# Patient Record
Sex: MALE | Race: WHITE | NOT HISPANIC OR LATINO | Employment: FULL TIME | URBAN - METROPOLITAN AREA
[De-identification: names, ages, dates, MRNs, and addresses within clinical notes are randomized per-mention and may not be internally consistent; named-entity substitution may affect disease eponyms.]

---

## 2024-09-20 ENCOUNTER — OFFICE VISIT (OUTPATIENT)
Dept: PRIMARY CARE | Facility: CLINIC | Age: 49
End: 2024-09-20
Payer: COMMERCIAL

## 2024-09-20 VITALS
DIASTOLIC BLOOD PRESSURE: 82 MMHG | SYSTOLIC BLOOD PRESSURE: 122 MMHG | RESPIRATION RATE: 20 BRPM | WEIGHT: 293.2 LBS | TEMPERATURE: 97.5 F | HEART RATE: 76 BPM | OXYGEN SATURATION: 98 % | BODY MASS INDEX: 42.07 KG/M2

## 2024-09-20 DIAGNOSIS — J40 BRONCHITIS: Primary | ICD-10-CM

## 2024-09-20 DIAGNOSIS — J02.9 SORE THROAT: ICD-10-CM

## 2024-09-20 LAB — POC RAPID STREP: NEGATIVE

## 2024-09-20 PROCEDURE — 99214 OFFICE O/P EST MOD 30 MIN: CPT | Performed by: NURSE PRACTITIONER

## 2024-09-20 PROCEDURE — 87880 STREP A ASSAY W/OPTIC: CPT | Performed by: NURSE PRACTITIONER

## 2024-09-20 RX ORDER — BENZONATATE 100 MG/1
100 CAPSULE ORAL 3 TIMES DAILY PRN
Qty: 30 CAPSULE | Refills: 0 | Status: SHIPPED | OUTPATIENT
Start: 2024-09-20 | End: 2024-09-30

## 2024-09-20 RX ORDER — METHYLPREDNISOLONE 4 MG/1
TABLET ORAL
Qty: 21 TABLET | Refills: 0 | Status: SHIPPED | OUTPATIENT
Start: 2024-09-20 | End: 2024-09-27

## 2024-09-20 ASSESSMENT — ENCOUNTER SYMPTOMS
WHEEZING: 1
RHINORRHEA: 1
SORE THROAT: 1
COUGH: 1

## 2024-09-20 NOTE — PROGRESS NOTES
Subjective   Patient ID: Derek Noe is a 49 y.o. male who presents for Cough (Congestion/Since just over a week/More like coughing fits- doesn't cough all day while at work, only in evening while at home/Did home covid test on Wed and was negative).  Does not smoke or vape.  Cough  This is a new problem. Episode onset: just over 1 week. The problem has been gradually worsening. The problem occurs every few hours. The cough is Productive of sputum (not every time he coughs, but green sometimes). Associated symptoms include rhinorrhea, a sore throat and wheezing. The symptoms are aggravated by lying down. Treatments tried: mucinex ibuprofen, dayquil. The treatment provided mild relief.     Review of Systems   HENT:  Positive for rhinorrhea and sore throat.    Respiratory:  Positive for cough and wheezing.      Objective   /82   Pulse 76   Temp 36.4 °C (97.5 °F)   Resp 20   Wt 133 kg (293 lb 3.2 oz)   SpO2 98%   BMI 42.07 kg/m²     Physical Exam  Vitals reviewed.   Constitutional:       General: He is not in acute distress.     Appearance: Normal appearance. He is ill-appearing (mildly). He is not toxic-appearing.   HENT:      Head: Normocephalic.      Nose: No congestion or rhinorrhea.      Mouth/Throat:      Pharynx: No posterior oropharyngeal erythema.   Eyes:      Conjunctiva/sclera: Conjunctivae normal.   Cardiovascular:      Rate and Rhythm: Normal rate and regular rhythm.      Pulses: Normal pulses.      Heart sounds: No murmur heard.  Pulmonary:      Effort: Pulmonary effort is normal.      Breath sounds: Normal breath sounds.   Abdominal:      General: Bowel sounds are normal.      Palpations: Abdomen is soft.   Musculoskeletal:      Cervical back: Neck supple.   Skin:     General: Skin is warm and dry.   Neurological:      General: No focal deficit present.      Mental Status: He is alert.   Psychiatric:         Mood and Affect: Mood normal.         Thought Content: Thought content normal.          Assessment/Plan   1. Bronchitis  methylPREDNISolone (Medrol Dospak) 4 mg tablets      2. Sore throat  POCT rapid strep A manually resulted      Increase oral fluids/water, at least eight 8-oz glasses/day.  Get plenty of rest.  Cepacol lozenges and/or Chloraseptic spray PRN for sore throat. Salt water gargle or broth for comfort.  Alternate Tylenol/Ibuprofen PRN for body aches and/or fever  Saline nasal spray PRN for rhinitis.  Guaifenessin/Guaifenissin DM PRN for cough  Flonase nasal spray.

## 2025-02-06 ENCOUNTER — APPOINTMENT (OUTPATIENT)
Dept: PRIMARY CARE | Facility: CLINIC | Age: 50
End: 2025-02-06
Payer: COMMERCIAL

## 2025-02-06 VITALS
BODY MASS INDEX: 44.81 KG/M2 | HEIGHT: 70 IN | RESPIRATION RATE: 20 BRPM | DIASTOLIC BLOOD PRESSURE: 88 MMHG | HEART RATE: 80 BPM | TEMPERATURE: 97 F | SYSTOLIC BLOOD PRESSURE: 132 MMHG | WEIGHT: 313 LBS

## 2025-02-06 DIAGNOSIS — R22.1 NECK MASS: ICD-10-CM

## 2025-02-06 DIAGNOSIS — M54.2 NECK PAIN ON RIGHT SIDE: Primary | ICD-10-CM

## 2025-02-06 DIAGNOSIS — M54.12 RIGHT CERVICAL RADICULOPATHY: ICD-10-CM

## 2025-02-06 PROBLEM — M54.50 LOW BACK PAIN: Status: ACTIVE | Noted: 2021-03-31

## 2025-02-06 PROBLEM — L02.414 ABSCESS OF ARM, LEFT: Status: ACTIVE | Noted: 2017-09-05

## 2025-02-06 PROBLEM — M47.816 LUMBAR SPONDYLOSIS: Status: ACTIVE | Noted: 2021-03-31

## 2025-02-06 PROBLEM — E66.01 OBESITY, CLASS III, BMI 40-49.9 (MORBID OBESITY) (MULTI): Status: ACTIVE | Noted: 2017-09-05

## 2025-02-06 PROBLEM — H81.10 BPPV (BENIGN PAROXYSMAL POSITIONAL VERTIGO): Status: ACTIVE | Noted: 2025-02-06

## 2025-02-06 PROBLEM — M48.02 DEGENERATIVE CERVICAL SPINAL STENOSIS: Status: ACTIVE | Noted: 2019-09-22

## 2025-02-06 PROBLEM — D72.829 LEUKOCYTOSIS: Status: ACTIVE | Noted: 2017-09-05

## 2025-02-06 PROBLEM — S39.012A LUMBAR STRAIN: Status: ACTIVE | Noted: 2025-02-06

## 2025-02-06 PROCEDURE — 1036F TOBACCO NON-USER: CPT | Performed by: FAMILY MEDICINE

## 2025-02-06 PROCEDURE — 3008F BODY MASS INDEX DOCD: CPT | Performed by: FAMILY MEDICINE

## 2025-02-06 PROCEDURE — 99214 OFFICE O/P EST MOD 30 MIN: CPT | Performed by: FAMILY MEDICINE

## 2025-02-06 ASSESSMENT — PATIENT HEALTH QUESTIONNAIRE - PHQ9
1. LITTLE INTEREST OR PLEASURE IN DOING THINGS: NOT AT ALL
SUM OF ALL RESPONSES TO PHQ9 QUESTIONS 1 AND 2: 0
2. FEELING DOWN, DEPRESSED OR HOPELESS: NOT AT ALL

## 2025-02-06 NOTE — PROGRESS NOTES
Derek Noe is a 49 y.o. male here today for   Chief Complaint   Patient presents with    Cyst        HPI     Patient with lump of right scapular area for at least 3 months.  Not painful or tender.  He says his wife just noticed it about 3 months ago but it may have been there a lot longer.  There is no redness or warmth or induration in this area.    He also complains of pain over the right trapezius and just medial to the shoulder blade.  He is not sure if this is related to the lump.  He says he is having some feelings of numbness and tingling down the right lateral arm and to the hand off and on.  He is not really noticing any weakness in his arm or hand.  He says the pain in his back and neck is not severe and not really worsening over time.    On review he does have a H/O C6-C7 disc herniation and was supposed to have surgery in 2019 because of right hand weakness.  But he says the surgery got delayed because of COVID and he never followed up with the neurosurgeon at [facility].          No current outpatient medications on file.    Patient Active Problem List   Diagnosis    Abscess of arm, left    BPPV (benign paroxysmal positional vertigo)    Degenerative cervical spinal stenosis    Leukocytosis    Low back pain    Lumbar spondylosis    Lumbar strain    Obesity, Class III, BMI 40-49.9 (morbid obesity) (Multi)         Objective    Visit Vitals    There were no vitals taken for this visit.    There is no height or weight on file to calculate BMI.     Physical Exam     Neck-over the patient's right posterior neck/trapezius is a large mass about the size of a softball.  It protrudes slightly.  There is no redness or warmth or tenderness and it is soft.    There is some tenderness just medial to the right scapula when I push.  There is no tenderness elsewhere.  Right arm strength seems normal and there is no gross motor or sensory deficit on exam.    Assessment & Plan  Neck pain on right side  Pain over the right side  of the neck may be secondary to this large mass which I suspect is a lipoma or this may be a manifestation of previous C6-C7 disc herniation.  He is not having any severe symptoms or worsening symptoms right now so I am going to refer him to Dr. Trevino and orthopedics for further evaluation.  I recommend that he rest his arm and not lift anything more than 20 pounds until he is evaluated by the specialist.  He can follow-up with me as needed.  Orders:    Referral to Orthopaedic Surgery; Future    Neck mass  As above.  After he is evaluated by Dr. Trevino he may need surgical excision of this large lipoma if it is causing symptoms.  Orders:    Referral to Orthopaedic Surgery; Future    Right cervical radiculopathy  Referred to Dr. Trevino as above.  Orders:    Referral to Orthopaedic Surgery; Future               No orders of the defined types were placed in this encounter.       No orders of the defined types were placed in this encounter.

## 2025-02-18 ENCOUNTER — HOSPITAL ENCOUNTER (OUTPATIENT)
Dept: RADIOLOGY | Facility: CLINIC | Age: 50
Discharge: HOME | End: 2025-02-18
Payer: COMMERCIAL

## 2025-02-18 ENCOUNTER — OFFICE VISIT (OUTPATIENT)
Dept: ORTHOPEDIC SURGERY | Facility: CLINIC | Age: 50
End: 2025-02-18
Payer: COMMERCIAL

## 2025-02-18 DIAGNOSIS — M54.2 NECK PAIN ON RIGHT SIDE: ICD-10-CM

## 2025-02-18 DIAGNOSIS — M54.12 RIGHT CERVICAL RADICULOPATHY: ICD-10-CM

## 2025-02-18 DIAGNOSIS — M54.12 RIGHT CERVICAL RADICULOPATHY: Primary | ICD-10-CM

## 2025-02-18 DIAGNOSIS — R22.1 NECK MASS: ICD-10-CM

## 2025-02-18 PROCEDURE — 99204 OFFICE O/P NEW MOD 45 MIN: CPT | Performed by: ORTHOPAEDIC SURGERY

## 2025-02-18 PROCEDURE — 72050 X-RAY EXAM NECK SPINE 4/5VWS: CPT

## 2025-02-18 PROCEDURE — 72050 X-RAY EXAM NECK SPINE 4/5VWS: CPT | Performed by: ORTHOPAEDIC SURGERY

## 2025-02-18 PROCEDURE — 99214 OFFICE O/P EST MOD 30 MIN: CPT | Performed by: ORTHOPAEDIC SURGERY

## 2025-02-18 NOTE — PROGRESS NOTES
Derek Noe is a 49 y.o. male who presents for neck pain. Sent by Dr. Berry. Xrays today.    HPI:  49-year-old gentleman here for evaluation of neck pain.  Sent by Dr. Berry.  He denies any fever chills nausea vomiting night sweats.  He has no bowel or bladder complaints.    Physical exam:  Well-nourished, well kept.  No lymphangitis or lymphadenopathy in the examined extremities. Affect normal.  Alert and oriented X 3.  Coordination normal.  Patient can rise from a seated position, can sit from a standing position. Can stand on heels and toes.  Patient is tender in the paraspinal musculature of the cervical spine, in the right trapezius area, he also has a palpable mass that feels like it might be a fatty lipoma in that area as well. range of motion is mildly decreased secondary to some pain and stiffness no weakness no instability to muscle strength. examination of the upper extremities reveals no point tenderness, swelling, or deformity.  Range of motion of the shoulders, elbows, wrists, and fingers are full without crepitance, instability, or exacerbation of pain I do get a very mild Neer and empty can on the right shoulder. Strength is 5/5 throughout. no redness, abrasions, or lesions on the upper extremities bilaterally.  Gross sensation intact to the extremities.  Deep tendon reflexes 2+ and symmetric bilaterally.  Montejo negative.     Imaging studies:  AP lateral flexion-extension plain films of lumbar spine were ordered and reviewed today.    Assessment:  49-year-old gentleman here for evaluation of neck pain right upper extremity radicular symptoms.  Sent by Dr. Berry.  This all started about 2019, he is having some neck trapezius and right parascapular pain.  He will get occasional radiating pain down the arm into the right hand, he is describing numbness and tingling out into the hand on the right side.  No left-sided involvement.  He was told in the past that he had a C6-7 issue but did not follow-up  because of COVID.  I get a little mild Neer and empty can on the right shoulder.  He is not having a lot of consistent symptoms in the arm, just the right hand mostly.  He does have a palpable mass in his right trapezius area that he was told was a fatty lipoma.  It is nontender.  His x-rays show some mild degenerative changes some moderate changes at C6-7 with anterior bridging osteophyte.    We have ordered and reviewed tests, x-rays, MRI.  We reviewed the notes from Dr. Berry from February 6, 2025 that mentions his referral to our spine team.  This is an exacerbation of a chronic problem that is affecting his bodily function.    For complete plan and/or surgical details, please refer to Dr. Trevino's portion of this split dictation.    -Kyree Hartman PA-C    In a face-to-face encounter, I performed a history and physical examination, discussed pertinent diagnostic studies if indicated, and discussed diagnosis and management strategies with both the patient and the midlevel provider.  I reviewed the midlevel's note and agree with the documented findings and plan of care.    Patient with right arm radicular type symptoms although really his main symptoms are numbness and tingling from his wrist down to his hand in the thumb and index finger.  This may just be carpal tunnel symptoms.  He has a little bit of pain in the periscapular area as well.  When he extends his neck back though he does reproduce some of the symptoms in his periscapular area so it is unclear how much of this is a radiculopathy and how much of this is carpal tunnel.  X-rays ordered and reviewed today show degenerative disc at C6-7.  He is 45 BMI and has a pretty thick neck.  We are going to work this new acute problem of uncertain prognosis up further with an MRI of the cervical spine and EMG nerve conduction study.  Will also get him into physical therapy.  He is also can work on weight loss as that will be essential if he ends up needing  surgery.  We will see him back after the MRI and the EMG.    Bassam Trevino MD  Orthopedic surgery

## 2025-03-19 ENCOUNTER — HOSPITAL ENCOUNTER (OUTPATIENT)
Dept: NEUROLOGY | Facility: HOSPITAL | Age: 50
Discharge: HOME | End: 2025-03-19
Payer: COMMERCIAL

## 2025-03-19 DIAGNOSIS — M54.2 NECK PAIN ON RIGHT SIDE: ICD-10-CM

## 2025-03-19 DIAGNOSIS — M54.12 RIGHT CERVICAL RADICULOPATHY: ICD-10-CM

## 2025-03-19 PROCEDURE — 95912 NRV CNDJ TEST 11-12 STUDIES: CPT | Performed by: STUDENT IN AN ORGANIZED HEALTH CARE EDUCATION/TRAINING PROGRAM

## 2025-03-19 PROCEDURE — 95886 MUSC TEST DONE W/N TEST COMP: CPT | Performed by: STUDENT IN AN ORGANIZED HEALTH CARE EDUCATION/TRAINING PROGRAM

## 2025-03-19 NOTE — ADDENDUM NOTE
Encounter addended by: Sherman Pisano on: 3/19/2025 4:35 PM   Actions taken: Imaging Exam ended, Check Out activity completed

## 2025-03-26 ENCOUNTER — HOSPITAL ENCOUNTER (OUTPATIENT)
Dept: RADIOLOGY | Facility: HOSPITAL | Age: 50
Discharge: HOME | End: 2025-03-26
Payer: COMMERCIAL

## 2025-03-26 DIAGNOSIS — M54.2 NECK PAIN ON RIGHT SIDE: ICD-10-CM

## 2025-03-26 DIAGNOSIS — M54.12 RIGHT CERVICAL RADICULOPATHY: ICD-10-CM

## 2025-03-26 PROCEDURE — 72141 MRI NECK SPINE W/O DYE: CPT

## 2025-04-22 ENCOUNTER — OFFICE VISIT (OUTPATIENT)
Dept: PRIMARY CARE | Facility: CLINIC | Age: 50
End: 2025-04-22
Payer: COMMERCIAL

## 2025-04-22 VITALS
TEMPERATURE: 98.4 F | BODY MASS INDEX: 43.91 KG/M2 | RESPIRATION RATE: 18 BRPM | HEART RATE: 85 BPM | SYSTOLIC BLOOD PRESSURE: 150 MMHG | OXYGEN SATURATION: 97 % | DIASTOLIC BLOOD PRESSURE: 90 MMHG | WEIGHT: 306 LBS

## 2025-04-22 DIAGNOSIS — J40 SINOBRONCHITIS: Primary | ICD-10-CM

## 2025-04-22 DIAGNOSIS — J32.9 SINOBRONCHITIS: Primary | ICD-10-CM

## 2025-04-22 PROCEDURE — 1036F TOBACCO NON-USER: CPT | Performed by: NURSE PRACTITIONER

## 2025-04-22 PROCEDURE — 99213 OFFICE O/P EST LOW 20 MIN: CPT | Performed by: NURSE PRACTITIONER

## 2025-04-22 RX ORDER — DOXYCYCLINE 100 MG/1
100 CAPSULE ORAL 2 TIMES DAILY
Qty: 20 CAPSULE | Refills: 0 | Status: SHIPPED | OUTPATIENT
Start: 2025-04-22 | End: 2025-05-02

## 2025-04-22 RX ORDER — ALBUTEROL SULFATE 90 UG/1
2 INHALANT RESPIRATORY (INHALATION) EVERY 4 HOURS PRN
Qty: 8.5 G | Refills: 0 | Status: SHIPPED | OUTPATIENT
Start: 2025-04-22 | End: 2025-05-22

## 2025-04-22 RX ORDER — METHYLPREDNISOLONE 4 MG/1
TABLET ORAL
Qty: 21 TABLET | Refills: 0 | Status: SHIPPED | OUTPATIENT
Start: 2025-04-22 | End: 2025-04-28

## 2025-04-22 ASSESSMENT — ENCOUNTER SYMPTOMS
SORE THROAT: 0
CHILLS: 0
COUGH: 1
APPETITE CHANGE: 0
SINUS PRESSURE: 1
VOMITING: 0
SHORTNESS OF BREATH: 1
FATIGUE: 0
DIARRHEA: 0
HEADACHES: 0
NAUSEA: 0
WHEEZING: 0
MYALGIAS: 0
RHINORRHEA: 1
CHEST TIGHTNESS: 0
FEVER: 0

## 2025-04-22 NOTE — PROGRESS NOTES
Subjective   Patient ID: Derek Noe is a 49 y.o. male who presents for Cough.    Patient says that he developed a cough and runny nose about three weeks ago. Patient has tried cough medicine and it has not helped. Patient never tested for COVID. Pt declines need for covid testing. No chest pain or difficulty breathing. No fevers.     Review of Systems   Constitutional:  Negative for appetite change, chills, fatigue and fever.   HENT:  Positive for congestion, postnasal drip, rhinorrhea and sinus pressure. Negative for ear pain and sore throat.    Respiratory:  Positive for cough and shortness of breath (with coughing fits). Negative for chest tightness and wheezing.    Cardiovascular:  Negative for chest pain.   Gastrointestinal:  Negative for diarrhea, nausea and vomiting.   Musculoskeletal:  Negative for myalgias.   Neurological:  Negative for headaches.     Objective   /90 (BP Location: Left arm, Patient Position: Sitting)   Pulse 85   Temp 36.9 °C (98.4 °F) (Tympanic)   Resp 18   Wt 139 kg (306 lb)   SpO2 97%   BMI 43.91 kg/m²     Physical Exam  Vitals reviewed.   Constitutional:       General: He is not in acute distress.     Appearance: Normal appearance. He is not toxic-appearing.   HENT:      Head: Atraumatic.      Right Ear: Tympanic membrane, ear canal and external ear normal.      Left Ear: Tympanic membrane, ear canal and external ear normal.      Nose: Congestion and rhinorrhea present.      Right Sinus: Frontal sinus tenderness present. No maxillary sinus tenderness.      Left Sinus: Frontal sinus tenderness present. No maxillary sinus tenderness.      Mouth/Throat:      Mouth: Mucous membranes are moist.      Pharynx: Oropharynx is clear. No oropharyngeal exudate or posterior oropharyngeal erythema.      Comments: Uvula midline, moderate post nasal drainage  Cardiovascular:      Rate and Rhythm: Normal rate and regular rhythm.      Heart sounds: Normal heart sounds. No murmur  heard.  Pulmonary:      Effort: Pulmonary effort is normal.      Breath sounds: Normal breath sounds. No wheezing or rhonchi.   Musculoskeletal:         General: Normal range of motion.   Skin:     General: Skin is warm and dry.   Neurological:      General: No focal deficit present.      Mental Status: He is alert.   Psychiatric:         Mood and Affect: Mood normal.     Assessment/Plan   Problem List Items Addressed This Visit    None  Visit Diagnoses         Codes      Sinobronchitis    -  Primary J32.9, J40    Relevant Medications    methylPREDNISolone (Medrol Dospak) 4 mg tablets    doxycycline (Vibramycin) 100 mg capsule    albuterol (ProAir HFA) 90 mcg/actuation inhaler        Patient started on medrol nkechi, doxycycline, proair inhaler. Pt to use inhaler every four to six hours as needed. Pt reminded to avoid other anti-inflammatories while on the steroids; he agreed.  Advised patient on use of humidifier and hot steam treatments. Discussed that patient is to drink plenty of fluids and stay well hydrated. Can take tylenol as needed for any fevers or discomfort. Discussed that patient is to go to the ER for any chest pain, difficulty breathing, shortness of breath or new/concerning symptoms; he agreed. Pt to follow up in 2-3 days if no better despite the use of the medications.     Of note, patient's BP is elevated in the office today. He has a monitor at home and will be checking daily and recording results. Patient to follow up with PCP if BP is persistently elevated; he agreed.